# Patient Record
Sex: FEMALE
[De-identification: names, ages, dates, MRNs, and addresses within clinical notes are randomized per-mention and may not be internally consistent; named-entity substitution may affect disease eponyms.]

---

## 2020-01-10 PROBLEM — Z00.00 ENCOUNTER FOR PREVENTIVE HEALTH EXAMINATION: Status: ACTIVE | Noted: 2020-01-10

## 2020-01-14 ENCOUNTER — APPOINTMENT (OUTPATIENT)
Dept: ENDOCRINOLOGY | Facility: CLINIC | Age: 35
End: 2020-01-14
Payer: MEDICAID

## 2020-01-14 VITALS
SYSTOLIC BLOOD PRESSURE: 103 MMHG | WEIGHT: 169 LBS | HEART RATE: 69 BPM | DIASTOLIC BLOOD PRESSURE: 67 MMHG | BODY MASS INDEX: 31.91 KG/M2 | HEIGHT: 61 IN

## 2020-01-14 PROCEDURE — 82962 GLUCOSE BLOOD TEST: CPT

## 2020-01-14 PROCEDURE — 99205 OFFICE O/P NEW HI 60 MIN: CPT | Mod: 25

## 2020-01-16 NOTE — END OF VISIT
[FreeTextEntry3] : All medical record entries made by the Scribe were at my, Dr. Samuel Martinez, direction and personally dictated by me on 01/14/2020. I have reviewed the chart and agree that the record accurately reflects my personal performance of the history, physical exam, assessment and plan. I have also personally directed, reviewed and agreed with the chart.  [>50% of Time Spent on Counseling for ____] : Greater than 50% of the encounter time was spent on counseling for [unfilled] [Time Spent: ___ minutes] : I have spent [unfilled] minutes of face to face time with the patient

## 2020-01-16 NOTE — ASSESSMENT
[Importance of Diet and Exercise] : importance of diet and exercise to improve glycemic control, achieve weight loss and improve cardiovascular health [Self Monitoring of Blood Glucose] : self monitoring of blood glucose [Levothyroxine] : The patient was instructed to take Levothyroxine on an empty stomach, separate from vitamins, and wait at least 30 minutes before eating [Action and use of Insulin] : action and use of short and long-acting insulin [FreeTextEntry1] : 33 y/o F pt with:\par 1. Hx of uncontrolled DM (formerly dx 1 yr after gestational DM in 2009), with recent A1c of 11.6%:\par Pt was initiated on Basal insulin in 2019; she is on Levemir 60u, Metformin 1g BID, and Victoza. \par Pt is catabolic with osmotic diuresis symptoms and she is c/o gaps in her teeth along with hair thinning/loss. \par In today's PE: pt had b/l neck skin tags and her heel pad appears wider. \par In addition, pt is on Atorvastatin 80 mg, ACE inhibitors, and Fibric acid. Diabetes treatment goals discussed. Recommend pt check BS before and after meals, and work on modify lifestyle and diet. Will order labs including C- peptide, B12, Folic acid, and IGF- 1. Refer pt to see NP this week.\par  \par 2. Hx of hypothyroidism:\par Pt is currently on 50 mcg of LT4, her thyroid exam today was normal.\par \par Return in 1-2 weeks.

## 2020-01-16 NOTE — HISTORY OF PRESENT ILLNESS
[FreeTextEntry1] : 33 y/o F pt, /67, BMI 31.93, with Hx of DM (initially dx as gestational DM in 2009, and then formally dx as DM in 2010), referred by Dr. Toshia Anderson, presents today to establish endocrine care with me.\par Pt is unaware of DM related complications\par Other PMHx: hypothyroidism (dx 5-7 yrs ago as per pt), obesity, asthma, hypercholesterolemia\par Denies PMHx of HTN\par PSHx: appendix (2008), cholecystectomy (2009), C- section 3x (in 11/2006, 7/2010, 8/2013), tubal ligation \par FHx: father (DM, hypothyroidism, heart disease, hypercholesterolemia), sister (thyroid disease, obesity), \par SHx: no tobacco, social etOH use\par 4 pregnancies with 1 miscarriage \par Checks BS occasionally\par Eats at 8:30AM/ snack at 12PM/ dinner at 5:30PM\par Walks less than she used to\par Last funduscopic visit: in 2019\par Last podiatrist visit: in 2018\par Last RD visit: in 2019\par Last PCP visit: 3 months ago \par \par 11/6/19 A1c 11.6%\par \par Pt states she had gestational DM in 2009, and was formally dx with DM ~1 yr later. Pt notes she was started on Metformin and afterwards began insulin in 2019.\par \par 1/14/20\par Today pt presents with her 8 y/o daughter and POCT of 307, with c/o headaches 2x a week for the past yr, frequent yeast infections, shifting/ teeth for the past 6-7 months, "building of tar even though I brush my teeth", hair loss ever since she was dx with thyroid 5-7 yrs ago, and weight loss for the past 2 months. She notes she was 179lb and is now 169lbs.\par Denies irregular menses, nocturia, blurry vision, increase in shoe size and galactorrhea.\par \par Current Mediations: Levemir 60u, Victoza, Metformin 1g BID, Atorvastatin 80mg, Lisinopril 2.5mg, Fenofibrate 160mg, Levothyroxine 25mcg BID, Multi-Vitamins

## 2020-01-16 NOTE — PHYSICAL EXAM
[Alert] : alert [Normal Sclera/Conjunctiva] : normal sclera/conjunctiva [Normal Outer Ear/Nose] : the ears and nose were normal in appearance [Thyroid Not Enlarged] : the thyroid was not enlarged [No Respiratory Distress] : no respiratory distress [Normal Rate] : heart rate was normal  [Clear to Auscultation] : lungs were clear to auscultation bilaterally [Normal S1, S2] : normal S1 and S2 [No Edema] : there was no peripheral edema [Normal Bowel Sounds] : normal bowel sounds [Spine Straight] : spine straight [Normal Gait] : normal gait [Acanthosis Nigricans] : acanthosis nigricans [Right Foot Was Examined] : right foot ~C was examined [Left Foot Was Examined] : left foot ~C was examined [1+] : 1+ in the dorsalis pedis [Normal Reflexes] : deep tendon reflexes were 2+ and symmetric [Oriented x3] : oriented to person, place, and time [No Neck Mass] : no neck mass was observed [Vibration Dec.] : normal vibratory sensation at the level of the toes [de-identified] : mild acanthosis nigricans, multiple skin tags, frontal teeth gap  [de-identified] : no buffalo hump [FreeTextEntry1] : no calluses, wide heel path [FreeTextEntry5] : no calluses, wide heel path

## 2020-01-16 NOTE — REVIEW OF SYSTEMS
[Recent Weight Loss (___ Lbs)] : recent [unfilled] ~Ulb weight loss [Hair Loss] : hair loss [Headache] : headaches [As Noted in HPI] : as noted in HPI [Negative] : Psychiatric [Blurry Vision] : no blurred vision [Irregular Menses] : regular menses [Nocturia] : no nocturia [Galactorrhea] : no galactorrhea  [FreeTextEntry8] : yeast infections  [de-identified] : denies increase in shoe size, shifting teeth,

## 2020-01-16 NOTE — CONSULT LETTER
[Dear  ___] : Dear  [unfilled], [Consult Letter:] : I had the pleasure of evaluating your patient, [unfilled]. [Consult Closing:] : Thank you very much for allowing me to participate in the care of this patient.  If you have any questions, please do not hesitate to contact me. [FreeTextEntry3] : Samuel Martinez

## 2020-01-16 NOTE — ADDENDUM
[FreeTextEntry1] : I, Luma Hernandez, acted solely as a scribe for Dr. Samuel Martinez on this date. 01/14/2020.

## 2020-01-17 ENCOUNTER — APPOINTMENT (OUTPATIENT)
Dept: ENDOCRINOLOGY | Facility: CLINIC | Age: 35
End: 2020-01-17
Payer: MEDICAID

## 2020-01-17 ENCOUNTER — RESULT CHARGE (OUTPATIENT)
Age: 35
End: 2020-01-17

## 2020-01-17 VITALS
DIASTOLIC BLOOD PRESSURE: 65 MMHG | WEIGHT: 169 LBS | HEART RATE: 68 BPM | BODY MASS INDEX: 31.93 KG/M2 | SYSTOLIC BLOOD PRESSURE: 102 MMHG

## 2020-01-17 DIAGNOSIS — E03.9 HYPOTHYROIDISM, UNSPECIFIED: ICD-10-CM

## 2020-01-17 DIAGNOSIS — E11.65 TYPE 2 DIABETES MELLITUS WITH HYPERGLYCEMIA: ICD-10-CM

## 2020-01-17 LAB
25(OH)D3 SERPL-MCNC: 34.3 NG/ML
ALBUMIN SERPL ELPH-MCNC: 4.8 G/DL
ALP BLD-CCNC: 88 U/L
ALT SERPL-CCNC: 37 U/L
ANION GAP SERPL CALC-SCNC: 14 MMOL/L
AST SERPL-CCNC: 21 U/L
BILIRUB SERPL-MCNC: 0.4 MG/DL
BUN SERPL-MCNC: 14 MG/DL
C PEPTIDE SERPL-MCNC: 6.9 NG/ML
CALCIUM SERPL-MCNC: 9.5 MG/DL
CHLORIDE SERPL-SCNC: 97 MMOL/L
CHOLEST SERPL-MCNC: 97 MG/DL
CHOLEST/HDLC SERPL: 2.6 RATIO
CO2 SERPL-SCNC: 25 MMOL/L
CREAT SERPL-MCNC: 0.7 MG/DL
CREAT SPEC-SCNC: 209 MG/DL
ESTIMATED AVERAGE GLUCOSE: 258 MG/DL
FOLATE SERPL-MCNC: 16 NG/ML
GLUCOSE BLDC GLUCOMTR-MCNC: 276
GLUCOSE BLDC GLUCOMTR-MCNC: 307
GLUCOSE SERPL-MCNC: 222 MG/DL
HBA1C MFR BLD HPLC: 10.6 %
HDLC SERPL-MCNC: 37 MG/DL
IGF-1 INTERP: NORMAL
IGF-I BLD-MCNC: 217 NG/ML
LDLC SERPL CALC-MCNC: 27 MG/DL
MICROALBUMIN 24H UR DL<=1MG/L-MCNC: 3.6 MG/DL
MICROALBUMIN/CREAT 24H UR-RTO: 17 MG/G
POTASSIUM SERPL-SCNC: 4 MMOL/L
PROT SERPL-MCNC: 7.7 G/DL
SODIUM SERPL-SCNC: 136 MMOL/L
T4 FREE SERPL-MCNC: 1.6 NG/DL
THYROGLOB AB SERPL-ACNC: <20 IU/ML
THYROPEROXIDASE AB SERPL IA-ACNC: 21.8 IU/ML
TRIGL SERPL-MCNC: 164 MG/DL
TSH SERPL-ACNC: 2.24 UIU/ML
TSH SERPL-ACNC: 2.36 UIU/ML
VIT B12 SERPL-MCNC: >2000 PG/ML

## 2020-01-17 PROCEDURE — 99214 OFFICE O/P EST MOD 30 MIN: CPT | Mod: 25

## 2020-01-17 PROCEDURE — 82962 GLUCOSE BLOOD TEST: CPT

## 2020-01-17 NOTE — ASSESSMENT
[Importance of Diet and Exercise] : importance of diet and exercise to improve glycemic control, achieve weight loss and improve cardiovascular health [Action and use of Insulin] : action and use of short and long-acting insulin [Self Monitoring of Blood Glucose] : self monitoring of blood glucose [Levothyroxine] : The patient was instructed to take Levothyroxine on an empty stomach, separate from vitamins, and wait at least 30 minutes before eating [FreeTextEntry1] : 33 y/o F pt with:\par \par 1. Hx of poorly controlled DM (formerly dx 1 yr after gestational DM in 2009);  recent A1c of 10.6% (12/14/20):\par Pt is on glucose toxicity. Will split Levemir into 25u BID and will initiate pt on Humalog 10u. Will discontinue Victoza due to GI disturbances. Advised pt to continue with Metformin. Importance of diet and lifestyle management explained to pt. \par Will continue assessing for DM related complications. \par  \par 2. Hx of hypothyroidism; recent TSH 2.36 (12/14/20):\par Pt is clinically and biochemically euthyroid. Advised pt to continue with Levothyroxine 25 mcg BID.\par \par Return in: 2 months [Long Term Vascular Complications] : long term vascular complications of diabetes

## 2020-01-17 NOTE — REVIEW OF SYSTEMS
[Recent Weight Loss (___ Lbs)] : recent [unfilled] ~Ulb weight loss [Nausea] : nausea [Hair Loss] : hair loss [As Noted in HPI] : as noted in HPI [Negative] : Psychiatric [Blurry Vision] : no blurred vision [Irregular Menses] : regular menses [Nocturia] : no nocturia [Galactorrhea] : no galactorrhea  [de-identified] : Denies increase in shoe size. reports shifting teeth.

## 2020-01-17 NOTE — HISTORY OF PRESENT ILLNESS
[FreeTextEntry1] : - 11/6/19 A1c 11.6%\par - 12/14/20: A1c 10.6%, s.creat 0.7, TSH 2.36, Free T4 1.6, Microalb/Creat Ratio 17, , LDL-c 27, C-Peptide 6.9, Vit D 25-OH 34.3, TPO ab 21.8, Thyroglobulin ab <20, \par \par 35 y/o F pt with Hx of DM (initially dx as gestational DM in 2009, and then formally dx as DM in 2010) with no  known DM related complications.\par Other PMHx: hypothyroidism (dx 5-7 yrs ago as per pt), obesity, asthma, hypercholesterolemia\par Denies PMHx of HTN\par PSHx: appendicits (2008), cholecystectomy (2009), C- section 3x (in 11/2006, 7/2010, 8/2013), tubal ligation \par FHx: father (DM, hypothyroidism, heart disease, hypercholesterolemia), sister (thyroid disease, obesity), \par SHx: no tobacco, social etOH use\par 4 pregnancies with 1 miscarriage \par Checks BS occasionally\par Eats at 8:30AM/ snack at 12PM/ dinner at 5:30PM\par Walks less than she used to\par Last funduscopic visit: 2019\par Last podiatrist visit: 2018\par Last RD visit: in 2019\par Last PCP visit: 3 months ago \par \par Pt states she had gestational DM in 2009, and was formally dx with DM ~1 yr later. Pt notes she was started on Metformin and afterwards began insulin in 2019.\par \par 1/14/20\par Today pt presents with her 6 y/o daughter and POCT of 307, with c/o headaches 2x a week for the past yr, frequent yeast infections, shifting/ teeth for the past 6-7 months, "building of tar even though I brush my teeth", hair loss ever since she was dx with thyroid 5-7 yrs ago, and weight loss for the past 2 months. She notes she was 179lb and is now 169lbs.\par Denies irregular menses, nocturia, blurry vision, increase in shoe size and galactorrhea.\par \par 01/17/2020\par Pt presents today with POCT 276 for lab reports and recommendation, feeling well. Pt is clinically unchanged. She brought in her BS monitoring device. FBS: 227, 244, 227. Postprandial BS: 332, 309, 252. Pt reports feeling nauseous when taking Victoza. \par \par Current Mediations: Levemir 60u, Victoza, Metformin 1g BID, Atorvastatin 80mg, Lisinopril 2.5mg, Fenofibrate 160mg, Levothyroxine 25mcg BID, Multi-Vitamins

## 2020-01-17 NOTE — END OF VISIT
[>50% of Time Spent on Counseling for ____] : Greater than 50% of the encounter time was spent on counseling for [unfilled] [FreeTextEntry3] : All medical record entries made by the Scribe were at my, Dr. Samuel Martinez, direction and personally dictated by me on 01/14/2020. I have reviewed the chart and agree that the record accurately reflects my personal performance of the history, physical exam, assessment and plan. I have also personally directed, reviewed and agreed with the chart.  [Time Spent: ___ minutes] : I have spent [unfilled] minutes of face to face time with the patient

## 2020-01-17 NOTE — PHYSICAL EXAM
[Alert] : alert [Normal Sclera/Conjunctiva] : normal sclera/conjunctiva [Thyroid Not Enlarged] : the thyroid was not enlarged [Normal Outer Ear/Nose] : the ears and nose were normal in appearance [No Neck Mass] : no neck mass was observed [No Respiratory Distress] : no respiratory distress [Clear to Auscultation] : lungs were clear to auscultation bilaterally [Normal Rate] : heart rate was normal  [Normal S1, S2] : normal S1 and S2 [No Edema] : there was no peripheral edema [Normal Bowel Sounds] : normal bowel sounds [Normal Gait] : normal gait [Spine Straight] : spine straight [Right Foot Was Examined] : right foot ~C was examined [Acanthosis Nigricans] : acanthosis nigricans [Left Foot Was Examined] : left foot ~C was examined [1+] : 1+ in the dorsalis pedis [Normal Reflexes] : deep tendon reflexes were 2+ and symmetric [Oriented x3] : oriented to person, place, and time [Vibration Dec.] : normal vibratory sensation at the level of the toes [de-identified] : no buffalo hump [FreeTextEntry1] : no calluses, wide heel path [FreeTextEntry5] : no calluses, wide heel path  [de-identified] : mild acanthosis nigricans, multiple skin tags, frontal teeth gap

## 2020-01-23 RX ORDER — INSULIN ASPART 100 [IU]/ML
100 INJECTION, SOLUTION INTRAVENOUS; SUBCUTANEOUS
Qty: 9 | Refills: 3 | Status: ACTIVE | COMMUNITY
Start: 2020-01-23 | End: 1900-01-01

## 2020-01-23 RX ORDER — INSULIN LISPRO 100 [IU]/ML
100 INJECTION, SOLUTION INTRAVENOUS; SUBCUTANEOUS
Qty: 4 | Refills: 0 | Status: COMPLETED | COMMUNITY
Start: 2020-01-17 | End: 2020-01-23

## 2020-02-28 ENCOUNTER — APPOINTMENT (OUTPATIENT)
Dept: ENDOCRINOLOGY | Facility: CLINIC | Age: 35
End: 2020-02-28

## 2020-03-19 ENCOUNTER — APPOINTMENT (OUTPATIENT)
Dept: ENDOCRINOLOGY | Facility: CLINIC | Age: 35
End: 2020-03-19